# Patient Record
Sex: FEMALE | Race: WHITE | NOT HISPANIC OR LATINO | Employment: FULL TIME
[De-identification: names, ages, dates, MRNs, and addresses within clinical notes are randomized per-mention and may not be internally consistent; named-entity substitution may affect disease eponyms.]

---

## 2017-06-18 ENCOUNTER — RECORDS - HEALTHEAST (OUTPATIENT)
Dept: ADMINISTRATIVE | Facility: OTHER | Age: 62
End: 2017-06-18

## 2018-10-22 ENCOUNTER — OFFICE VISIT - HEALTHEAST (OUTPATIENT)
Dept: UROLOGY | Facility: CLINIC | Age: 63
End: 2018-10-22

## 2018-10-22 DIAGNOSIS — N20.9 CALCULUS OF URINARY TRACT: ICD-10-CM

## 2018-10-22 DIAGNOSIS — N20.1 CALCULUS OF URETER: ICD-10-CM

## 2018-10-22 DIAGNOSIS — N13.2 HYDRONEPHROSIS WITH URINARY OBSTRUCTION DUE TO URETERAL CALCULUS: ICD-10-CM

## 2018-10-22 LAB
ALBUMIN UR-MCNC: NEGATIVE MG/DL
APPEARANCE UR: CLEAR
BILIRUB UR QL STRIP: NEGATIVE
COLOR UR AUTO: YELLOW
GLUCOSE UR STRIP-MCNC: NEGATIVE MG/DL
HGB UR QL STRIP: ABNORMAL
KETONES UR STRIP-MCNC: NEGATIVE MG/DL
LEUKOCYTE ESTERASE UR QL STRIP: NEGATIVE
NITRATE UR QL: NEGATIVE
PH UR STRIP: 6 [PH] (ref 5–8)
SP GR UR STRIP: <=1.005 (ref 1–1.03)
UROBILINOGEN UR STRIP-ACNC: ABNORMAL

## 2018-11-05 ENCOUNTER — OFFICE VISIT - HEALTHEAST (OUTPATIENT)
Dept: UROLOGY | Facility: CLINIC | Age: 63
End: 2018-11-05

## 2018-11-05 ENCOUNTER — HOSPITAL ENCOUNTER (OUTPATIENT)
Dept: CT IMAGING | Facility: CLINIC | Age: 63
Discharge: HOME | End: 2018-11-05
Attending: PHYSICIAN ASSISTANT

## 2018-11-05 DIAGNOSIS — N20.1 CALCULUS OF URETER: ICD-10-CM

## 2018-11-05 DIAGNOSIS — N20.9 CALCULUS OF URINARY TRACT: ICD-10-CM

## 2018-11-05 LAB
ALBUMIN UR-MCNC: NEGATIVE MG/DL
APPEARANCE UR: CLEAR
BILIRUB UR QL STRIP: NEGATIVE
COLOR UR AUTO: YELLOW
GLUCOSE UR STRIP-MCNC: NEGATIVE MG/DL
HGB UR QL STRIP: NEGATIVE
KETONES UR STRIP-MCNC: NEGATIVE MG/DL
LEUKOCYTE ESTERASE UR QL STRIP: NEGATIVE
NITRATE UR QL: NEGATIVE
PH UR STRIP: 7 [PH] (ref 5–8)
SP GR UR STRIP: 1.01 (ref 1–1.03)
UROBILINOGEN UR STRIP-ACNC: NORMAL

## 2018-11-20 ENCOUNTER — OFFICE VISIT - HEALTHEAST (OUTPATIENT)
Dept: UROLOGY | Facility: CLINIC | Age: 63
End: 2018-11-20

## 2018-11-20 ENCOUNTER — HOSPITAL ENCOUNTER (OUTPATIENT)
Dept: CT IMAGING | Facility: CLINIC | Age: 63
Discharge: HOME | End: 2018-11-20
Attending: PHYSICIAN ASSISTANT

## 2018-11-20 DIAGNOSIS — N20.0 CALCULUS OF KIDNEY: ICD-10-CM

## 2018-11-20 DIAGNOSIS — N20.1 CALCULUS OF URETER: ICD-10-CM

## 2018-11-20 LAB
ALBUMIN UR-MCNC: NEGATIVE MG/DL
APPEARANCE UR: CLEAR
BILIRUB UR QL STRIP: NEGATIVE
COLOR UR AUTO: YELLOW
GLUCOSE UR STRIP-MCNC: NEGATIVE MG/DL
HGB UR QL STRIP: ABNORMAL
KETONES UR STRIP-MCNC: NEGATIVE MG/DL
LEUKOCYTE ESTERASE UR QL STRIP: NEGATIVE
NITRATE UR QL: NEGATIVE
PH UR STRIP: 5.5 [PH] (ref 5–8)
SP GR UR STRIP: 1.02 (ref 1–1.03)
UROBILINOGEN UR STRIP-ACNC: ABNORMAL

## 2018-11-22 ENCOUNTER — ANESTHESIA - HEALTHEAST (OUTPATIENT)
Dept: SURGERY | Facility: CLINIC | Age: 63
End: 2018-11-22

## 2018-11-23 ENCOUNTER — SURGERY - HEALTHEAST (OUTPATIENT)
Dept: SURGERY | Facility: CLINIC | Age: 63
End: 2018-11-23

## 2018-11-23 ASSESSMENT — MIFFLIN-ST. JEOR: SCORE: 1198.18

## 2018-11-29 ENCOUNTER — AMBULATORY - HEALTHEAST (OUTPATIENT)
Dept: UROLOGY | Facility: CLINIC | Age: 63
End: 2018-11-29

## 2018-11-29 DIAGNOSIS — N20.1 CALCULUS OF URETER: ICD-10-CM

## 2018-11-29 LAB
ALBUMIN UR-MCNC: ABNORMAL MG/DL
APPEARANCE UR: ABNORMAL
BILIRUB UR QL STRIP: NEGATIVE
COLOR UR AUTO: YELLOW
GLUCOSE UR STRIP-MCNC: NEGATIVE MG/DL
HGB UR QL STRIP: ABNORMAL
KETONES UR STRIP-MCNC: NEGATIVE MG/DL
LEUKOCYTE ESTERASE UR QL STRIP: ABNORMAL
NITRATE UR QL: NEGATIVE
PH UR STRIP: 5.5 [PH] (ref 5–8)
SP GR UR STRIP: 1.01 (ref 1–1.03)
UROBILINOGEN UR STRIP-ACNC: ABNORMAL

## 2018-11-30 LAB — BACTERIA SPEC CULT: NO GROWTH

## 2018-12-27 ENCOUNTER — OFFICE VISIT - HEALTHEAST (OUTPATIENT)
Dept: UROLOGY | Facility: CLINIC | Age: 63
End: 2018-12-27

## 2018-12-27 DIAGNOSIS — N20.1 CALCULUS OF URETER: ICD-10-CM

## 2018-12-27 DIAGNOSIS — N20.0 CALCULUS OF KIDNEY: ICD-10-CM

## 2018-12-27 LAB
ALBUMIN UR-MCNC: NEGATIVE MG/DL
APPEARANCE UR: CLEAR
BILIRUB UR QL STRIP: NEGATIVE
COLOR UR AUTO: YELLOW
GLUCOSE UR STRIP-MCNC: NEGATIVE MG/DL
HGB UR QL STRIP: ABNORMAL
KETONES UR STRIP-MCNC: NEGATIVE MG/DL
LEUKOCYTE ESTERASE UR QL STRIP: NEGATIVE
NITRATE UR QL: NEGATIVE
PH UR STRIP: 5.5 [PH] (ref 5–8)
SP GR UR STRIP: 1.01 (ref 1–1.03)
UROBILINOGEN UR STRIP-ACNC: ABNORMAL

## 2020-07-28 ENCOUNTER — COMMUNICATION - HEALTHEAST (OUTPATIENT)
Dept: SCHEDULING | Facility: CLINIC | Age: 65
End: 2020-07-28

## 2021-05-29 ENCOUNTER — HEALTH MAINTENANCE LETTER (OUTPATIENT)
Age: 66
End: 2021-05-29

## 2021-05-31 VITALS — BODY MASS INDEX: 28.58 KG/M2 | HEIGHT: 62 IN | WEIGHT: 155 LBS

## 2021-06-02 VITALS — WEIGHT: 155.6 LBS | BODY MASS INDEX: 29.38 KG/M2 | HEIGHT: 61 IN

## 2021-06-16 PROBLEM — N20.1 CALCULUS OF URETER: Status: ACTIVE | Noted: 2018-11-06

## 2021-06-21 NOTE — PROGRESS NOTES
Assessment/Plan:        Diagnoses and all orders for this visit:    Calculus of ureter  -     Symptom Control While Passing a Stone Education  -     CT Abdomen Pelvis Without Oral Without IV Contrast; Future; Expected date: 11/19/18  -     Patient Stated Goal: Pass my stone    Calculus of urinary tract  -     Urinalysis Macroscopic      Stone Management Plan  [facility] Stone [facility] 10/22/2018 11/5/2018   Urinary Tract Infection No suspicion of infection No suspicion of infection   Renal Colic Asymptomatic at this time Asymptomatic at this time   Renal Failure No suspicion of renal failure No suspicion of renal failure   Current CT date 10/21/2018 11/5/2018   Right sided stones? Yes Yes   R Number of ureteral stones 1 1   R GSD of ureteral stones 5 5   R Location of ureteral stone Distal Distal   R Number of kidney stones  No renal stones No renal stones   R GSD of kidney stones N/A N/A   R Hydronephrosis Mild None   R Stone Event New event Established event   Diagnosis date 10/21/2018 -   Initial location of primary symptomatic stone Distal -   Initial GSD of primary symptomatic stone 5 -   R MET status Initiation No progression   R Current Plan MET MET   MET 2 week F/U 2 week F/U   Left sided stones? No Yes   L Number of ureteral stones - No ureteral stones   L Number of kidney stones  - 1   L GSD of kidney stones - < 2   L Hydronephrosis - None   L Stone Event No current event No current event   L Current Plan - Observe   Observe rationale - Limited stone burden with good prognosis for spontaneous passage         Subjective:      HPI  Ms. Fidelina Ferris is a 62 y.o.  female returning to the [facility] [place] for medical expulsive therapy follow up.     On last encounter, her 5 mm stone was in right distal ureter with mild hydronephrosis. She has had no unanticipated events.    Symptoms have been minimal . She is asymptomatic at present. She denies symptoms of fever, chills, flank pain,  nausea, vomiting, urinary frequency and dysuria.     New CT scan was personally reviewed and demonstrates no progression of stone with resolution of previous hydronephrosis. Faint 1 mm left lower pole renal stone.    PLAN    61 yo F first time stone former with no progression of right distal ureteral stone, initially presenting with left back pain.    She will continue to attempt to pass stone and will return in 2 weeks with further imaging.     Patient also seen and examined by Isa Vraela PA-C       ROS   Review of systems is negative except for HPI.    Past Medical History:   Diagnosis Date     Kidney stone 10/2018    first stone at age 62       Past Surgical History:   Procedure Laterality Date     ROTATOR CUFF REPAIR Right 10/2018       Current Outpatient Prescriptions   Medication Sig Dispense Refill     cetirizine (ZYRTEC) 10 MG tablet        fluticasone (FLONASE) 50 mcg/actuation nasal spray USE 2 SPRAYS INTO BOTH NOSTRILS DAILY.       levothyroxine (SYNTHROID, LEVOTHROID) 50 MCG tablet TAKE ONE TABLET BY MOUTH EVERY DAY       pantoprazole (PROTONIX) 20 MG tablet Take 40 mg by mouth.       No current facility-administered medications for this visit.        Allergies   Allergen Reactions     Nitrofurantoin Shortness Of Breath     Sulfa (Sulfonamide Antibiotics) Swelling     Nasal swelling     Vicodin [Hydrocodone-Acetaminophen] Dizziness     Room spins       Social History     Social History     Marital status:      Spouse name: N/A     Number of children: N/A     Years of education: N/A     Occupational History     cook      Social History Main Topics     Smoking status: Never Smoker     Smokeless tobacco: Never Used     Alcohol use No     Drug use: No     Sexual activity: Not on file     Other Topics Concern     Not on file     Social History Narrative       Family History   Problem Relation Age of Onset     Osteoporosis Mother      Cancer Father      pancreatic     Osteoporosis Sister       Urolithiasis Brother      Cancer Paternal Aunt      brain tumor     Osteoporosis Paternal Uncle      Cancer Maternal Grandmother      breast     Heart disease Maternal Grandfather      Heart disease Paternal Grandfather      Urolithiasis Niece      Urolithiasis Nephew      Cancer Paternal Aunt      breast     Objective:      Physical Exam  Vitals:    11/05/18 1455   BP: 129/66   Pulse: (!) 104   Temp: 98.2  F (36.8  C)     General - well developed, well nourished, appropriate for age. Appears no distress at this time  Abdomen - slender soft, non-tender, no hepatosplenomegaly, no masses.   - no flank tenderness, no suprapubic tenderness, kidney and bladder non-palpable  MSK - normal spinal curvature. no spinal tenderness. normal gait. muscular strength intact.  Psych - oriented to time, place, and person, normal mood and affect.      Labs   Urinalysis POC (Office):  Nitrite, UA   Date Value Ref Range Status   11/05/2018 Negative Negative Final   10/22/2018 Negative Negative Final   10/21/2018 Negative Negative Final       Lab Urinalysis:  Blood, UA   Date Value Ref Range Status   11/05/2018 Negative Negative Final   10/22/2018 Trace (!) Negative Final   10/21/2018 Negative Negative Final     Nitrite, UA   Date Value Ref Range Status   11/05/2018 Negative Negative Final   10/22/2018 Negative Negative Final   10/21/2018 Negative Negative Final     Leukocytes, UA   Date Value Ref Range Status   11/05/2018 Negative Negative Final   10/22/2018 Negative Negative Final   10/21/2018 Negative Negative Final     pH, UA   Date Value Ref Range Status   11/05/2018 7.0 5.0 - 8.0 Final   10/22/2018 6.0 5.0 - 8.0 Final   10/21/2018 6.0 4.5 - 8.0 Final

## 2021-06-21 NOTE — ANESTHESIA PREPROCEDURE EVALUATION
Anesthesia Evaluation      Patient summary reviewed   History of anesthetic complications (PONV)     Airway   Mallampati: I  Neck ROM: full   Pulmonary - negative ROS and normal exam    breath sounds clear to auscultation                         Cardiovascular - negative ROS and normal exam  Exercise tolerance: > or = 4 METS  (-) murmur  Rhythm: regular  Rate: normal,    no murmur      Neuro/Psych - negative ROS     Endo/Other    (+) hypothyroidism,      GI/Hepatic/Renal    (+) GERD well controlled,   chronic renal disease (Kidney stone),           Dental    (+) caps    Comment: Crowns throughout                       Anesthesia Plan  Planned anesthetic: general LMA and total IV anesthesia  Gastric LMA    TIVA  Scopolamine patch  Decadron 10 mg  Zofran  ASA 2   Induction: intravenous   Anesthetic plan and risks discussed with: patient and spouse  Anesthesia plan special considerations: antiemetics,   Post-op plan: routine recovery

## 2021-06-21 NOTE — PROGRESS NOTES
Assessment/Plan:        Diagnoses and all orders for this visit:    Calculus of ureter  -     Ureteroscopy Education  -     Patient Stated Goal: Know what to expect after surgery    Calculus of kidney  -     Urinalysis Macroscopic      Stone Management Plan  [facility] Stone Management 10/22/2018 11/5/2018 11/20/2018   Urinary Tract Infection No suspicion of infection No suspicion of infection No suspicion of infection   Renal Colic Asymptomatic at this time Asymptomatic at this time Asymptomatic at this time   Renal Failure No suspicion of renal failure No suspicion of renal failure No suspicion of renal failure   Current CT date 10/21/2018 11/5/2018 11/20/2018   Right sided stones? Yes Yes Yes   R Number of ureteral stones 1 1 1   R GSD of ureteral stones 5 5 5   R Location of ureteral stone Distal Distal Distal   R Number of kidney stones  No renal stones No renal stones No renal stones   R GSD of kidney stones N/A N/A N/A   R Hydronephrosis Mild None None   R Stone Event New event Established event Established event   Diagnosis date 10/21/2018 - -   Initial location of primary symptomatic stone Distal - -   Initial GSD of primary symptomatic stone 5 - -   R MET status Initiation No progression No progression   R Current Plan MET MET Clear   MET 2 week F/U 2 week F/U -   Clear rationale - - MET failure   Left sided stones? No Yes No   L Number of ureteral stones - No ureteral stones -   L Number of kidney stones  - 1 -   L GSD of kidney stones - < 2 -   L Hydronephrosis - None -   L Stone Event No current event No current event No current event   L Current Plan - Observe -   Observe rationale - Limited stone burden with good prognosis for spontaneous passage -             Subjective:      HPI  Ms. Fidelina Ferris is a 63 y.o.  female returning to the [facility] [place] for medical expulsive therapy follow up.     On last encounter, her 5 mm stone was in right distal ureter with no  hydronephrosis. She has had no unanticipated events.    Symptoms have been minimal, with mild right side pain several days ago that resolved without intervention. She is asymptomatic at present. She denies symptoms of fever, chills, flank pain, nausea, vomiting, urinary frequency and dysuria. She is growing tired of her trial of passage phase, but is keeping a positive outlook.    New CT scan was personally reviewed and demonstrates no progression of stone with no hydronephrosis.     PLAN    61 yo F first time stone former with no progression of non-obstructing right distal ureteral stone, initially diagnosed 1 month ago.    She would like to proceed to the operating room for ureteroscopic stone clearance. Risks and benefits were detailed of ureteroscopic stone clearance including potential issues of urinary or systemic infection, ureteral injury, inaccessible stone, incomplete stone clearance, multiple surgeries, and stent related symptoms of urgency, frequency and hematuria. Current documentation is adequate for preoperative history and physical.    Patient also seen and examined by Isa Varela PA-C       ROS   Review of systems is negative except for HPI.    Past Medical History:   Diagnosis Date     GERD (gastroesophageal reflux disease)      Kidney stone 10/2018    first stone at age 62       Past Surgical History:   Procedure Laterality Date     ROTATOR CUFF REPAIR Right 10/2018       Current Outpatient Medications   Medication Sig Dispense Refill     cetirizine (ZYRTEC) 10 MG tablet        levothyroxine (SYNTHROID, LEVOTHROID) 50 MCG tablet TAKE ONE TABLET BY MOUTH EVERY DAY       pantoprazole (PROTONIX) 20 MG tablet Take 40 mg by mouth.       fluticasone (FLONASE) 50 mcg/actuation nasal spray USE 2 SPRAYS INTO BOTH NOSTRILS DAILY.       No current facility-administered medications for this visit.        Allergies   Allergen Reactions     Nitrofurantoin Shortness Of Breath     Sulfa (Sulfonamide Antibiotics)  Swelling     Nasal swelling     Vicodin [Hydrocodone-Acetaminophen] Dizziness     Room spins       Social History     Socioeconomic History     Marital status:      Spouse name: Not on file     Number of children: Not on file     Years of education: Not on file     Highest education level: Not on file   Social Needs     Financial resource strain: Not on file     Food insecurity - worry: Not on file     Food insecurity - inability: Not on file     Transportation needs - medical: Not on file     Transportation needs - non-medical: Not on file   Occupational History     Occupation: cook   Tobacco Use     Smoking status: Never Smoker     Smokeless tobacco: Never Used   Substance and Sexual Activity     Alcohol use: No     Drug use: No     Sexual activity: Not on file   Other Topics Concern     Not on file   Social History Narrative     Not on file       Family History   Problem Relation Age of Onset     Osteoporosis Mother      Cancer Father         pancreatic     Osteoporosis Sister      Urolithiasis Brother      Cancer Paternal Aunt         brain tumor     Osteoporosis Paternal Uncle      Cancer Maternal Grandmother         breast     Heart disease Maternal Grandfather      Heart disease Paternal Grandfather      Urolithiasis Niece      Urolithiasis Nephew      Cancer Paternal Aunt         breast     Objective:      Physical Exam  Vitals:    11/20/18 0754   BP: 137/75   Pulse: 86   Temp: 97.7  F (36.5  C)     General - well developed, well nourished, appropriate for age. Appears no distress at this time   Heart - regular rate and rhythm, no murmur  Respiratory - normal effort, clear to auscultation, good air entry without adventitious noises  Abdomen - slender soft, non-tender, no hepatosplenomegaly, no masses.   - no flank tenderness, no suprapubic tenderness, kidney and bladder non-palpable  MSK - normal spinal curvature. no spinal tenderness. normal gait. muscular strength intact.  Psych - oriented to  time, place, and person, normal mood and affect.    Labs   Urinalysis POC (Office):  Nitrite, UA   Date Value Ref Range Status   11/20/2018 Negative Negative Final   11/05/2018 Negative Negative Final   10/22/2018 Negative Negative Final       Lab Urinalysis:  Blood, UA   Date Value Ref Range Status   11/20/2018 Trace (!) Negative Final   11/05/2018 Negative Negative Final   10/22/2018 Trace (!) Negative Final     Nitrite, UA   Date Value Ref Range Status   11/20/2018 Negative Negative Final   11/05/2018 Negative Negative Final   10/22/2018 Negative Negative Final     Leukocytes, UA   Date Value Ref Range Status   11/20/2018 Negative Negative Final   11/05/2018 Negative Negative Final   10/22/2018 Negative Negative Final     pH, UA   Date Value Ref Range Status   11/20/2018 5.5 5.0 - 8.0 Final   11/05/2018 7.0 5.0 - 8.0 Final   10/22/2018 6.0 5.0 - 8.0 Final

## 2021-06-21 NOTE — ANESTHESIA CARE TRANSFER NOTE
Last vitals:   Vitals:    11/23/18 0806   BP: 103/58   Pulse: 91   Resp: 12   Temp: 36.3  C (97.4  F)   SpO2: 98%     Patient's level of consciousness is drowsy  Spontaneous respirations: yes  Maintains airway independently: yes  Dentition unchanged: yes  Oropharynx: oral airway in place    QCDR Measures:  ASA# 20 - Surgical Safety Checklist: WHO surgical safety checklist completed prior to induction    PQRS# 430 - Adult PONV Prevention: 4558F - Pt received => 2 anti-emetic agents (different classes) preop & intraop  ASA# 8 - Peds PONV Prevention: NA - Not pediatric patient, not GA or 2 or more risk factors NOT present  PQRS# 424 - Yvette-op Temp Management: 4559F - At least one body temp DOCUMENTED => 35.5C or 95.9F within required timeframe  PQRS# 426 - PACU Transfer Protocol: - Transfer of care checklist used  ASA# 14 - Acute Post-op Pain: ASA14B - Patient did NOT experience pain >= 7 out of 10

## 2021-06-21 NOTE — PROGRESS NOTES
Assessment/Plan:        Diagnoses and all orders for this visit:    Calculus of ureter  -     Symptom Control While Passing a Stone Education  -     CT Abdomen Pelvis Without Oral Without IV Contrast; Future; Expected date: 11/5/18  -     Patient Stated Goal: Pass my stone    Calculus of urinary tract  -     Urinalysis Macroscopic    Hydronephrosis with urinary obstruction due to ureteral calculus    Other orders  -     fluticasone (FLONASE) 50 mcg/actuation nasal spray; USE 2 SPRAYS INTO BOTH NOSTRILS DAILY.  -     levothyroxine (SYNTHROID, LEVOTHROID) 50 MCG tablet; TAKE ONE TABLET BY MOUTH EVERY DAY  -     pantoprazole (PROTONIX) 20 MG tablet; Take 40 mg by mouth.  -     predniSONE (DELTASONE) 20 MG tablet; Take 40 mg by mouth.  -     cetirizine (ZYRTEC) 10 MG tablet;       Stone Management Plan  [facility] Stone Management 10/22/2018   Urinary Tract Infection No suspicion of infection   Renal Colic Asymptomatic at this time   Renal Failure No suspicion of renal failure   Current CT date 10/21/2018   Right sided stones? Yes   R Number of ureteral stones 1   R GSD of ureteral stones 5   R Location of ureteral stone Distal   R Number of kidney stones  No renal stones   R GSD of kidney stones N/A   R Hydronephrosis Mild   R Stone Event New event   Diagnosis date 10/21/2018   Initial location of primary symptomatic stone Distal   Initial GSD of primary symptomatic stone 5   R MET status Initiation   R Current Plan MET   MET 2 week F/U   Left sided stones? No   L Stone Event No current event         Subjective:      HPI  Ms. Fidelina Ferris is a 62 y.o.  female presenting to the [facility] [place] following ED visit for urolithiasis.    She is a first time unidentified composition stone former who has not required stone clearance procedures. She has not previously participated in stone risk evaluation. She has no identified modifiable stone risk factors. She has no identified non-modifiable stone  risk factors.    Primary symptom at presentation was left lower back pain x 1 week. The pain was intermittent with no associated alleviating or aggravating factors. She notes diffuse lower abdominal pain, left > right occurring yesterday with severe left back pain. At its worst, the pain was sharp, 10/10 with some improvement following use of oxycodone and visteral. She has also experienced tingling and numbness into the left lower extremity with back pain and was seen by her PCP recently for sciatica. She was recommended for physical therapy.     She is asymptomatic at present. She denies current symptoms of fever, chills, flank pain, nausea, vomiting, urinary frequency and dysuria.     CT scan from 10/21/18 is personally reviewed and demonstrates a mildly obstructing 5 mm right distal ureteral stone. No additional urinary tract stones.    Significant labs from presentation include no hematuria, no pyuria, negative nitrite, no bacteria, normal WBC, normal creatinine and normal potassium.    PLAN    63 yo F first time stone former with incidentally diagnosed right distal ureteral stone, initially presenting with left back pain.    Will proceed with medical expulsive therapy. Risks and benefits were detailed of medical expulsive therapy including probability of stone passage, recurrent renal colic, and requirement of emergency medical and/or surgical care and further imaging. Patient verbalized understanding. Patient agrees with plan as discussed. She will return in 2 weeks with low dose CT scan.    For symptom control, she was prescribed oxycodone. Over the counter symptom control medications of ibuprofen, Dramamine and Tylenol were recommended.    Patient also seen and examined by ANMOL Contreras   Review of Systems  A 12 point comprehensive review of systems is negative except for HPI    Past Medical History:   Diagnosis Date     Kidney stone 10/2018    first stone at age 62       Past Surgical  History:   Procedure Laterality Date     ROTATOR CUFF REPAIR Right 10/2018       Current Outpatient Prescriptions   Medication Sig Dispense Refill     cetirizine (ZYRTEC) 10 MG tablet        fluticasone (FLONASE) 50 mcg/actuation nasal spray USE 2 SPRAYS INTO BOTH NOSTRILS DAILY.       levothyroxine (SYNTHROID, LEVOTHROID) 50 MCG tablet TAKE ONE TABLET BY MOUTH EVERY DAY       pantoprazole (PROTONIX) 20 MG tablet Take 40 mg by mouth.       predniSONE (DELTASONE) 20 MG tablet Take 40 mg by mouth.       No current facility-administered medications for this visit.        Allergies   Allergen Reactions     Nitrofurantoin Shortness Of Breath     Sulfa (Sulfonamide Antibiotics) Swelling     Nasal swelling     Vicodin [Hydrocodone-Acetaminophen] Dizziness     Room spins       Social History     Social History     Marital status:      Spouse name: N/A     Number of children: N/A     Years of education: N/A     Occupational History     cook      Social History Main Topics     Smoking status: Never Smoker     Smokeless tobacco: Never Used     Alcohol use No     Drug use: No     Sexual activity: Not on file     Other Topics Concern     Not on file     Social History Narrative       Family History   Problem Relation Age of Onset     Osteoporosis Mother      Cancer Father      pancreatic     Osteoporosis Sister      Urolithiasis Brother      Cancer Paternal Aunt      brain tumor     Osteoporosis Paternal Uncle      Cancer Maternal Grandmother      breast     Heart disease Maternal Grandfather      Heart disease Paternal Grandfather      Urolithiasis Niece      Urolithiasis Nephew      Cancer Paternal Aunt      breast       Objective:      Physical Exam  Vitals:    10/22/18 1032   BP: 124/60   Pulse: (!) 104     General - well developed, well nourished, appropriate for age. Appears no distress at this time   Heart - regular rate and rhythm, no murmur  Respiratory - normal effort, clear to auscultation, good air entry  without adventitious noises  Abdomen - mildly obese soft, non-tender, no hepatosplenomegaly, no masses.   - no flank tenderness, no suprapubic tenderness, kidney and bladder non-palpable  MSK - normal spinal curvature. no spinal tenderness. normal gait. muscular strength intact.  Neurology - cranial nerves II-XII grossly intact, normal sensation, no unsteadiness  Skin - intact, no bruising, no gouty tophi  Psych - oriented to time, place, and person, normal mood and affect.    Labs  Urinalysis POC (Office):  Nitrite, UA   Date Value Ref Range Status   10/22/2018 Negative Negative Final   10/21/2018 Negative Negative Final       Lab Urinalysis:  Blood, UA   Date Value Ref Range Status   10/22/2018 Trace (!) Negative Final   10/21/2018 Negative Negative Final     Nitrite, UA   Date Value Ref Range Status   10/22/2018 Negative Negative Final   10/21/2018 Negative Negative Final     Leukocytes, UA   Date Value Ref Range Status   10/22/2018 Negative Negative Final   10/21/2018 Negative Negative Final     pH, UA   Date Value Ref Range Status   10/22/2018 6.0 5.0 - 8.0 Final   10/21/2018 6.0 4.5 - 8.0 Final    and Acute Labs   CBC   WBC   Date Value Ref Range Status   10/21/2018 10.9 4.0 - 11.0 thou/uL Final     Hemoglobin   Date Value Ref Range Status   10/21/2018 11.6 (L) 12.0 - 16.0 g/dL Final     Platelets   Date Value Ref Range Status   10/21/2018 276 140 - 440 thou/uL Final    and Renal Panel  [facility]  Creatinine   Date Value Ref Range Status   10/21/2018 0.73 0.60 - 1.10 mg/dL Final     Potassium   Date Value Ref Range Status   10/21/2018 3.7 3.5 - 5.0 mmol/L Final     Calcium   Date Value Ref Range Status   10/21/2018 9.0 8.5 - 10.5 mg/dL Final

## 2021-06-21 NOTE — PROGRESS NOTES
Patient presents to the office today from emergency room visit follow-up for stone consultation.

## 2021-06-21 NOTE — ANESTHESIA POSTPROCEDURE EVALUATION
Patient: Fidelina Ferris  CYSTOSCOPY, RIGHT  URETEROSCOPY,  STENT INSERTION  Anesthesia type: general    Patient location: Phase II Recovery  Last vitals:   Vitals:    11/23/18 0900   BP: 120/66   Pulse: 87   Resp: 16   Temp:    SpO2: 93%     Post vital signs: stable  Level of consciousness: awake, alert and oriented  Post-anesthesia pain: pain controlled  Post-anesthesia nausea and vomiting: no  Pulmonary: unassisted, return to baseline  Cardiovascular: stable and blood pressure at baseline  Hydration: adequate  Anesthetic events: no    QCDR Measures:  ASA# 11 - Yvette-op Cardiac Arrest: ASA11B - Patient did NOT experience unanticipated cardiac arrest  ASA# 12 - Yvette-op Mortality Rate: ASA12B - Patient did NOT die  ASA# 13 - PACU Re-Intubation Rate: ASA13B - Patient did NOT require a new airway mgmt  ASA# 10 - Composite Anes Safety: ASA10A - No serious adverse event    Additional Notes:

## 2021-06-22 NOTE — PROGRESS NOTES
Assessment/Plan:        Diagnoses and all orders for this visit:    Calculus of ureter  -     Urinalysis Macroscopic  -     Culture, Urine- Today; Future; Expected date: 11/29/2018  -     Culture, Urine- Today  -     Cystoscopy with Stent Removal Education  -     Patient Stated Goal: Prevent further stones  -     ciprofloxacin HCl tablet 250 mg (CIPRO); Take 1 tablet (250 mg total) by mouth once.          Other orders  -     IBUPROFEN ORAL; Take 200 mg by mouth.  -     dimenhyDRINATE (DRAMAMINE) 50 MG tablet; Take 50 mg by mouth at bedtime as needed.  -     HYDROXYZINE HCL ORAL; Take by mouth.  -     oxyCODONE 5 mg TbOr; Take 5 mg by mouth every 4 (four) hours.  -     ciprofloxacin HCl (CIPRO) 250 MG tablet;       Stone Management Plan  [facility] [place] Stone Management 11/5/2018 11/20/2018 11/29/2018   Urinary Tract Infection No suspicion of infection No suspicion of infection No suspicion of infection   Renal Colic Asymptomatic at this time Asymptomatic at this time Well controlled symptoms   Renal Failure No suspicion of renal failure No suspicion of renal failure No suspicion of renal failure   Current CT date 11/5/2018 11/20/2018 -   Right sided stones? Yes Yes -   R Number of ureteral stones 1 1 -   R GSD of ureteral stones 5 5 -   R Location of ureteral stone Distal Distal -   R Number of kidney stones  No renal stones No renal stones -   R GSD of kidney stones N/A N/A -   R Hydronephrosis None None -   R Stone Event Established event Established event Established event   Diagnosis date - - -   Initial location of primary symptomatic stone - - -   Initial GSD of primary symptomatic stone - - -   R MET status No progression No progression -   R Current Plan MET Clear -   MET 2 week F/U - -   Clear rationale - MET failure -   Left sided stones? Yes No -   L Number of ureteral stones No ureteral stones - -   L Number of kidney stones  1 - -   L GSD of kidney stones < 2 - -   L Hydronephrosis None - -   L Stone Event No  current event No current event No current event   L Current Plan Observe - -   Observe rationale Limited stone burden with good prognosis for spontaneous passage - -             Subjective:      HPI  Ms. Fidelina Ferris is a 63 y.o.  female returning to the [facility] [place] for early postoperative follow up for anticipated stent removal.     She returns status post right ureteroscopic extraction for distal ureteral stone. She has had no unanticipated post-operative events.    She has had no symptoms suspicious for infection and stent was mildly symptomatic with typical issues of flank discomfort and lower urinary tract irritation.     Flexible cystoscopy is performed and indwelling stent is removed without incident.    She will follow up in the office in one month without imaging.       ROS   Review of systems is negative except for HPI.    Past Medical History:   Diagnosis Date     GERD (gastroesophageal reflux disease)      Kidney stone 10/2018    first stone at age 62     PONV (postoperative nausea and vomiting)        Past Surgical History:   Procedure Laterality Date     APPENDECTOMY       BLADDER REPAIR       CHOLECYSTECTOMY       HYSTERECTOMY       OOPHORECTOMY Bilateral      [place] CYSTO/URETERO W/LITHOTRIPSY &INDWELL STENT INSRT Right 11/23/2018    Procedure: CYSTOSCOPY, RIGHT  URETEROSCOPY,  STENT INSERTION;  Surgeon: Siva Douglas MD;  Location: [facility];  Service: Urology     ROTATOR CUFF REPAIR Right 06/20/20118     TONSILLECTOMY       TUBAL LIGATION         Current Outpatient Medications   Medication Sig Dispense Refill     ascorbate calcium (VITAMIN C ORAL) Take 1 tablet by mouth daily.       CALCIUM ORAL Take 1 tablet by mouth daily.       cetirizine (ZYRTEC) 10 MG tablet Take 10 mg by mouth at bedtime .             dimenhyDRINATE (DRAMAMINE) 50 MG tablet Take 50 mg by mouth at bedtime as needed.       fluticasone (FLONASE) 50 mcg/actuation nasal spray USE 2  SPRAYS INTO BOTH NOSTRILS DAILY as needed       glucosamine HCl/chondroitin thomas (GLUCOSAMINE-CHONDROITIN ORAL) Take 1 tablet by mouth daily.       HYDROXYZINE HCL ORAL Take by mouth.       IBUPROFEN ORAL Take 200 mg by mouth.       levothyroxine (SYNTHROID, LEVOTHROID) 50 MCG tablet TAKE ONE TABLET BY MOUTH EVERY DAY       MULTIVITAMIN ORAL Take 1 tablet by mouth daily.       oxyCODONE 5 mg TbOr Take 5 mg by mouth every 4 (four) hours.       pantoprazole (PROTONIX) 20 MG tablet Take 20 mg by mouth 2 (two) times a day .             No current facility-administered medications for this visit.        Allergies   Allergen Reactions     Nitrofurantoin Shortness Of Breath     Adhesive Tape-Silicones Itching     Redness       Latex Itching and Other (See Comments)     redness     Other Food Allergy      strawberries and raspberries cause tounge gets raw       Seafood      hives     Sulfa (Sulfonamide Antibiotics) Swelling     Nasal swelling     Vicodin [Hydrocodone-Acetaminophen] Dizziness     Room spins       Social History     Socioeconomic History     Marital status:      Spouse name: Not on file     Number of children: Not on file     Years of education: Not on file     Highest education level: Not on file   Social Needs     Financial resource strain: Not on file     Food insecurity - worry: Not on file     Food insecurity - inability: Not on file     Transportation needs - medical: Not on file     Transportation needs - non-medical: Not on file   Occupational History     Occupation: cook   Tobacco Use     Smoking status: Never Smoker     Smokeless tobacco: Never Used   Substance and Sexual Activity     Alcohol use: No     Drug use: No     Sexual activity: Not on file   Other Topics Concern     Not on file   Social History Narrative     Not on file       Family History   Problem Relation Age of Onset     Osteoporosis Mother      Cancer Father         pancreatic     Osteoporosis Sister      Urolithiasis Brother       Cancer Paternal Aunt         brain tumor     Osteoporosis Paternal Uncle      Cancer Maternal Grandmother         breast     Heart disease Maternal Grandfather      Heart disease Paternal Grandfather      Urolithiasis Niece      Urolithiasis Nephew      Cancer Paternal Aunt         breast     Objective:      Physical Exam  Vitals:    11/29/18 1539   BP: 136/69   Pulse: 84   Temp: 97.6  F (36.4  C)     General - well developed, well nourished, appropriate for age. Appears no distress at this time  Abdomen - mildly obese soft, non-tender, no hepatosplenomegaly, no masses.   - no flank tenderness, no suprapubic tenderness, kidney and bladder non-palpable  MSK - normal spinal curvature. no spinal tenderness. normal gait. muscular strength intact.  Psych - oriented to time, place, and person, normal mood and affect.      Labs   Urinalysis POC (Office):  Nitrite, UA   Date Value Ref Range Status   11/29/2018 Negative Negative Final   11/20/2018 Negative Negative Final   11/05/2018 Negative Negative Final       Lab Urinalysis:  Blood, UA   Date Value Ref Range Status   11/29/2018 Large (!) Negative Final   11/20/2018 Trace (!) Negative Final   11/05/2018 Negative Negative Final     Nitrite, UA   Date Value Ref Range Status   11/29/2018 Negative Negative Final   11/20/2018 Negative Negative Final   11/05/2018 Negative Negative Final     Leukocytes, UA   Date Value Ref Range Status   11/29/2018 Moderate (!) Negative Final   11/20/2018 Negative Negative Final   11/05/2018 Negative Negative Final     pH, UA   Date Value Ref Range Status   11/29/2018 5.5 5.0 - 8.0 Final   11/20/2018 5.5 5.0 - 8.0 Final   11/05/2018 7.0 5.0 - 8.0 Final

## 2021-06-22 NOTE — PROGRESS NOTES
Assessment/Plan:        Diagnoses and all orders for this visit:    Calculus of ureter  -     Patient Stated Goal: Prevent further stones  -     Calcium Oxalate Stone Prevention Education    Calculus of kidney  -     Urinalysis Macroscopic      Stone Management Plan  [facility] Stone Management 11/20/2018 11/29/2018 12/27/2018   Urinary Tract Infection No suspicion of infection No suspicion of infection No suspicion of infection   Renal Colic Asymptomatic at this time Well controlled symptoms Asymptomatic at this time   Renal Failure No suspicion of renal failure No suspicion of renal failure No suspicion of renal failure   Current CT date 11/20/2018 - -   Right sided stones? Yes - -   R Number of ureteral stones 1 - -   R GSD of ureteral stones 5 - -   R Location of ureteral stone Distal - -   R Number of kidney stones  No renal stones - -   R GSD of kidney stones N/A - -   R Hydronephrosis None - -   R Stone Event Established event Established event Resolved event   Diagnosis date - - -   Initial location of primary symptomatic stone - - -   Initial GSD of primary symptomatic stone - - -   Resolved date - - 12/27/2018   R Post-op status - - No imaging   R MET status No progression - -   R Current Plan Clear - -   MET - - -   Clear rationale MET failure - -   Left sided stones? No - No   L Number of ureteral stones - - -   L Number of kidney stones  - - -   L GSD of kidney stones - - -   L Hydronephrosis - - -   L Stone Event No current event No current event No current event   L Current Plan - - -   Observe rationale - - -         Subjective:      HPI  Ms. Fidelina Ferris is a 63 y.o.  female returning to the [facility] [place] for late postoperative follow-up.     She returns status post Right ureteroscopic extraction for distal ureteral stone. She has had no unanticipated events.     She is asymptomatic at present. She denies symptoms of fever, chills, flank pain, nausea, vomiting, urinary  frequency and dysuria.    Imaging was not performed today because stone was extracted intact and patient is asymptomatic.     Stone composition was 100% calcium oxalate.     PLAN    61 yo F first time stone former s/p ureteroscopic extraction of obstructing right distal ureteral stone, no postoperative imaging warranted.    She is a low risk first time stone former and was educated on conservative strategies for risk reduction. She is happy to follow up on a prn basis.    Patient also seen and examined by ANMOL Contreras   Review of systems is negative except for HPI.    Past Medical History:   Diagnosis Date     GERD (gastroesophageal reflux disease)      Kidney stone 10/2018    first stone at age 62     PONV (postoperative nausea and vomiting)        Past Surgical History:   Procedure Laterality Date     APPENDECTOMY       BLADDER REPAIR       CHOLECYSTECTOMY       HYSTERECTOMY       OOPHORECTOMY Bilateral      [place] CYSTO/URETERO W/LITHOTRIPSY &INDWELL STENT INSRT Right 11/23/2018    Procedure: CYSTOSCOPY, RIGHT  URETEROSCOPY,  STENT INSERTION;  Surgeon: Siva Douglas MD;  Location: [facility];  Service: Urology     ROTATOR CUFF REPAIR Right 06/20/20118     TONSILLECTOMY       TUBAL LIGATION         Current Outpatient Medications   Medication Sig Dispense Refill     ascorbate calcium (VITAMIN C ORAL) Take 1 tablet by mouth daily.       CALCIUM ORAL Take 1 tablet by mouth daily.       cetirizine (ZYRTEC) 10 MG tablet Take 10 mg by mouth at bedtime .             fluticasone (FLONASE) 50 mcg/actuation nasal spray USE 2 SPRAYS INTO BOTH NOSTRILS DAILY as needed       glucosamine HCl/chondroitin thomas (GLUCOSAMINE-CHONDROITIN ORAL) Take 1 tablet by mouth daily.       levothyroxine (SYNTHROID, LEVOTHROID) 50 MCG tablet TAKE ONE TABLET BY MOUTH EVERY DAY       MULTIVITAMIN ORAL Take 1 tablet by mouth daily.       pantoprazole (PROTONIX) 20 MG tablet Take 20 mg by mouth 2 (two) times a day .              No current facility-administered medications for this visit.        Allergies   Allergen Reactions     Nitrofurantoin Shortness Of Breath     Adhesive Tape-Silicones Itching     Redness       Latex Itching and Other (See Comments)     redness     Other Food Allergy      strawberries and raspberries cause tounge gets raw       Seafood      hives     Sulfa (Sulfonamide Antibiotics) Swelling     Nasal swelling     Vicodin [Hydrocodone-Acetaminophen] Dizziness     Room spins       Social History     Socioeconomic History     Marital status:      Spouse name: Not on file     Number of children: Not on file     Years of education: Not on file     Highest education level: Not on file   Social Needs     Financial resource strain: Not on file     Food insecurity - worry: Not on file     Food insecurity - inability: Not on file     Transportation needs - medical: Not on file     Transportation needs - non-medical: Not on file   Occupational History     Occupation: cook   Tobacco Use     Smoking status: Never Smoker     Smokeless tobacco: Never Used   Substance and Sexual Activity     Alcohol use: No     Drug use: No     Sexual activity: Not on file   Other Topics Concern     Not on file   Social History Narrative     Not on file       Family History   Problem Relation Age of Onset     Osteoporosis Mother      Cancer Father         pancreatic     Osteoporosis Sister      Urolithiasis Brother      Cancer Paternal Aunt         brain tumor     Osteoporosis Paternal Uncle      Cancer Maternal Grandmother         breast     Heart disease Maternal Grandfather      Heart disease Paternal Grandfather      Urolithiasis Niece      Urolithiasis Nephew      Cancer Paternal Aunt         breast     Objective:      Physical Exam  Vitals:    12/27/18 1259   BP: 133/70   Pulse: 98   Temp: 98.2  F (36.8  C)     General - well developed, well nourished, appropriate for age. Appears no distress at this time  Abdomen - slender soft,  non-tender, no hepatosplenomegaly, no masses.   - no flank tenderness, no suprapubic tenderness, kidney and bladder non-palpable  MSK - normal spinal curvature. no spinal tenderness. normal gait. muscular strength intact.  Psych - oriented to time, place, and person, normal mood and affect.      Labs   Urinalysis POC (Office):  Nitrite, UA   Date Value Ref Range Status   12/27/2018 Negative Negative Final   11/29/2018 Negative Negative Final   11/20/2018 Negative Negative Final       Lab Urinalysis:  Blood, UA   Date Value Ref Range Status   12/27/2018 Trace (!) Negative Final   11/29/2018 Large (!) Negative Final   11/20/2018 Trace (!) Negative Final     Nitrite, UA   Date Value Ref Range Status   12/27/2018 Negative Negative Final   11/29/2018 Negative Negative Final   11/20/2018 Negative Negative Final     Leukocytes, UA   Date Value Ref Range Status   12/27/2018 Negative Negative Final   11/29/2018 Moderate (!) Negative Final   11/20/2018 Negative Negative Final     pH, UA   Date Value Ref Range Status   12/27/2018 5.5 5.0 - 8.0 Final   11/29/2018 5.5 5.0 - 8.0 Final   11/20/2018 5.5 5.0 - 8.0 Final

## 2021-06-22 NOTE — PROGRESS NOTES
Patient in office today for cystoscopy right ureteral stent removal procedure.    Patient educated regarding stent removal procedure and possible symptoms after removal.  Patient voiced understanding of information.  Handout given to patient.  Consent form signed.    [facility] Timeout    Correct patient?: Yes  Correct site?:  Yes  Correct procedure?:  Yes  Correct laterality?:  Right  Consents verified?:  Yes  Relevant lab results available?:  Yes

## 2021-09-18 ENCOUNTER — HEALTH MAINTENANCE LETTER (OUTPATIENT)
Age: 66
End: 2021-09-18

## 2021-10-24 ENCOUNTER — HOSPITAL ENCOUNTER (EMERGENCY)
Facility: CLINIC | Age: 66
Discharge: HOME | End: 2021-10-24
Attending: FAMILY MEDICINE | Admitting: FAMILY MEDICINE
Payer: COMMERCIAL

## 2021-10-24 ENCOUNTER — APPOINTMENT (OUTPATIENT)
Dept: CT IMAGING | Facility: CLINIC | Age: 66
End: 2021-10-24
Attending: FAMILY MEDICINE
Payer: COMMERCIAL

## 2021-10-24 ENCOUNTER — APPOINTMENT (OUTPATIENT)
Dept: MRI IMAGING | Facility: CLINIC | Age: 66
End: 2021-10-24
Attending: FAMILY MEDICINE
Payer: COMMERCIAL

## 2021-10-24 VITALS
SYSTOLIC BLOOD PRESSURE: 120 MMHG | HEART RATE: 66 BPM | WEIGHT: 157 LBS | BODY MASS INDEX: 29.18 KG/M2 | OXYGEN SATURATION: 97 % | TEMPERATURE: 96.9 F | DIASTOLIC BLOOD PRESSURE: 72 MMHG | RESPIRATION RATE: 16 BRPM

## 2021-10-24 DIAGNOSIS — G44.219 EPISODIC TENSION-TYPE HEADACHE, NOT INTRACTABLE: ICD-10-CM

## 2021-10-24 DIAGNOSIS — R20.0 RIGHT FACIAL NUMBNESS: ICD-10-CM

## 2021-10-24 LAB
ANION GAP SERPL CALCULATED.3IONS-SCNC: 7 MMOL/L (ref 3–14)
BASOPHILS # BLD AUTO: 0.1 10E3/UL (ref 0–0.2)
BASOPHILS NFR BLD AUTO: 1 %
BUN SERPL-MCNC: 12 MG/DL (ref 7–30)
CALCIUM SERPL-MCNC: 8.6 MG/DL (ref 8.5–10.1)
CHLORIDE BLD-SCNC: 110 MMOL/L (ref 94–109)
CO2 SERPL-SCNC: 24 MMOL/L (ref 20–32)
CREAT SERPL-MCNC: 0.7 MG/DL (ref 0.52–1.04)
EOSINOPHIL # BLD AUTO: 0.3 10E3/UL (ref 0–0.7)
EOSINOPHIL NFR BLD AUTO: 5 %
ERYTHROCYTE [DISTWIDTH] IN BLOOD BY AUTOMATED COUNT: 13.3 % (ref 10–15)
GFR SERPL CREATININE-BSD FRML MDRD: >90 ML/MIN/1.73M2
GLUCOSE BLD-MCNC: 124 MG/DL (ref 70–99)
GLUCOSE BLDC GLUCOMTR-MCNC: 128 MG/DL (ref 70–99)
HCT VFR BLD AUTO: 43.6 % (ref 35–47)
HGB BLD-MCNC: 14.4 G/DL (ref 11.7–15.7)
HOLD SPECIMEN: NORMAL
IMM GRANULOCYTES # BLD: 0 10E3/UL
IMM GRANULOCYTES NFR BLD: 0 %
LYMPHOCYTES # BLD AUTO: 2.6 10E3/UL (ref 0.8–5.3)
LYMPHOCYTES NFR BLD AUTO: 41 %
MCH RBC QN AUTO: 29.5 PG (ref 26.5–33)
MCHC RBC AUTO-ENTMCNC: 33 G/DL (ref 31.5–36.5)
MCV RBC AUTO: 89 FL (ref 78–100)
MONOCYTES # BLD AUTO: 0.5 10E3/UL (ref 0–1.3)
MONOCYTES NFR BLD AUTO: 8 %
NEUTROPHILS # BLD AUTO: 2.9 10E3/UL (ref 1.6–8.3)
NEUTROPHILS NFR BLD AUTO: 45 %
NRBC # BLD AUTO: 0 10E3/UL
NRBC BLD AUTO-RTO: 0 /100
PLATELET # BLD AUTO: 262 10E3/UL (ref 150–450)
POTASSIUM BLD-SCNC: 3.9 MMOL/L (ref 3.4–5.3)
RBC # BLD AUTO: 4.88 10E6/UL (ref 3.8–5.2)
SODIUM SERPL-SCNC: 141 MMOL/L (ref 133–144)
TROPONIN I SERPL-MCNC: <0.015 UG/L (ref 0–0.04)
WBC # BLD AUTO: 6.3 10E3/UL (ref 4–11)

## 2021-10-24 PROCEDURE — 99285 EMERGENCY DEPT VISIT HI MDM: CPT | Mod: 25 | Performed by: FAMILY MEDICINE

## 2021-10-24 PROCEDURE — 36415 COLL VENOUS BLD VENIPUNCTURE: CPT | Performed by: FAMILY MEDICINE

## 2021-10-24 PROCEDURE — 99207 PR NO BILLABLE SERVICE THIS VISIT: CPT | Performed by: STUDENT IN AN ORGANIZED HEALTH CARE EDUCATION/TRAINING PROGRAM

## 2021-10-24 PROCEDURE — 70496 CT ANGIOGRAPHY HEAD: CPT

## 2021-10-24 PROCEDURE — A9585 GADOBUTROL INJECTION: HCPCS | Performed by: FAMILY MEDICINE

## 2021-10-24 PROCEDURE — 70450 CT HEAD/BRAIN W/O DYE: CPT

## 2021-10-24 PROCEDURE — 93005 ELECTROCARDIOGRAM TRACING: CPT | Performed by: FAMILY MEDICINE

## 2021-10-24 PROCEDURE — 250N000011 HC RX IP 250 OP 636: Performed by: FAMILY MEDICINE

## 2021-10-24 PROCEDURE — 255N000002 HC RX 255 OP 636: Performed by: FAMILY MEDICINE

## 2021-10-24 PROCEDURE — 250N000009 HC RX 250: Performed by: FAMILY MEDICINE

## 2021-10-24 PROCEDURE — 70553 MRI BRAIN STEM W/O & W/DYE: CPT

## 2021-10-24 PROCEDURE — 85004 AUTOMATED DIFF WBC COUNT: CPT | Performed by: FAMILY MEDICINE

## 2021-10-24 PROCEDURE — 84484 ASSAY OF TROPONIN QUANT: CPT | Performed by: FAMILY MEDICINE

## 2021-10-24 PROCEDURE — 80048 BASIC METABOLIC PNL TOTAL CA: CPT | Performed by: FAMILY MEDICINE

## 2021-10-24 PROCEDURE — 93010 ELECTROCARDIOGRAM REPORT: CPT | Performed by: FAMILY MEDICINE

## 2021-10-24 PROCEDURE — 99451 NTRPROF PH1/NTRNET/EHR 5/>: CPT | Performed by: PHYSICIAN ASSISTANT

## 2021-10-24 RX ORDER — IOPAMIDOL 755 MG/ML
70 INJECTION, SOLUTION INTRAVASCULAR ONCE
Status: COMPLETED | OUTPATIENT
Start: 2021-10-24 | End: 2021-10-24

## 2021-10-24 RX ORDER — GADOBUTROL 604.72 MG/ML
7 INJECTION INTRAVENOUS ONCE
Status: COMPLETED | OUTPATIENT
Start: 2021-10-24 | End: 2021-10-24

## 2021-10-24 RX ADMIN — IOPAMIDOL 70 ML: 755 INJECTION, SOLUTION INTRAVENOUS at 11:44

## 2021-10-24 RX ADMIN — GADOBUTROL 7 ML: 604.72 INJECTION INTRAVENOUS at 12:30

## 2021-10-24 RX ADMIN — SODIUM CHLORIDE 100 ML: 9 INJECTION, SOLUTION INTRAVENOUS at 11:44

## 2021-10-24 ASSESSMENT — ENCOUNTER SYMPTOMS
NAUSEA: 0
SINUS PRESSURE: 0
DIARRHEA: 0
DIAPHORESIS: 0
NUMBNESS: 1
FREQUENCY: 0
ABDOMINAL PAIN: 0
VOMITING: 0
COUGH: 0
SHORTNESS OF BREATH: 0
FEVER: 0
HEADACHES: 1
SORE THROAT: 0
BLOOD IN STOOL: 0
WHEEZING: 0
PALPITATIONS: 0
CONSTIPATION: 0
DYSURIA: 0
CHILLS: 0

## 2021-10-24 NOTE — ED NOTES
Back from CT, changed into MRI gown, filling out paperwork. Denies headache, neurologically intact.

## 2021-10-24 NOTE — ED PROVIDER NOTES
History     Chief Complaint   Patient presents with     Numbness     headache     HPI  Fidelina Ferris is a 65 year old female whopresents with acute onset right facial numbness.  No associated weakness.  Onset 1015 hrs.  No associated changes in speech or swallowing.  She felt as if she may fall over or needed help with balance possibly versus near syncope.  No vertigo.  No hearing change.  Headache at onset that was central parietal.  No radiation.  No vision change.  She had no significant nausea or vomiting or light sensitivity.  No seizure activity.  She has had recent mild headaches on and off.  She has no focal weakness.  There is been no head injury.  He has no fever.  No palpitations or chest pain.  No history of CVA or underlying heart disease.  There were no palliative or provocative measures.  She continues to have mild to moderate numbness that appears to migrate across the right side of the face.  Past medical history is reviewed as below but no history of known heart disease cerebrovascular disease.  she has hyperlipidemia and hypothyroidism.    Allergies:  Allergies   Allergen Reactions     Sulfa Drugs Swelling     Facial swelling         Problem List:    Patient Active Problem List    Diagnosis Date Noted     Calculus of ureter 11/06/2018     Priority: Medium     Hypothyroidism 08/17/2009     Priority: Medium     Formatting of this note might be different from the original.  Epic       Allergic rhinitis 02/26/2007     Priority: Medium     Hyperlipidemia 02/26/2007     Priority: Medium     Formatting of this note might be different from the original.  High trigs from estrogen          Past Medical History:    Past Medical History:   Diagnosis Date     GERD (gastroesophageal reflux disease)      Kidney stone 10/2018     PONV (postoperative nausea and vomiting)        Past Surgical History:    Past Surgical History:   Procedure Laterality Date     APPENDECTOMY       ARTHROSCOPY SHOULDER ROTATOR CUFF  REPAIR Right 06/20/20118     CHOLECYSTECTOMY       HYSTERECTOMY       OOPHORECTOMY Bilateral      FL CYSTO/URETERO W/LITHOTRIPSY &INDWELL STENT INSRT Right 11/23/2018    Procedure: CYSTOSCOPY, RIGHT  URETEROSCOPY,  STENT INSERTION;  Surgeon: Siva Douglas MD;  Location: [facility];  Service: Urology     REPAIR BLADDER       TONSILLECTOMY       TUBAL LIGATION         Family History:    Family History   Problem Relation Age of Onset     Osteoporosis Mother      Cancer Father         pancreatic     Osteoporosis Sister      Urolithiasis Brother      Cancer Paternal Aunt         brain tumor     Osteoporosis Paternal Uncle      Cancer Maternal Grandmother         breast     Heart Disease Maternal Grandfather      Heart Disease Paternal Grandfather      Urolithiasis Niece      Urolithiasis Nephew      Cancer Paternal Aunt         breast       Social History:  Marital Status:   [2]  Social History     Tobacco Use     Smoking status: Never Smoker     Smokeless tobacco: Never Used   Substance Use Topics     Alcohol use: No     Drug use: No        Medications:    ESTROGENS CONJ SYNTHETIC A 0.45 MG OR TABS  METOCLOPRAMIDE HCL 10 MG OR TABS          Review of Systems   Constitutional: Negative for chills, diaphoresis and fever.   HENT: Negative for ear pain, sinus pressure and sore throat.    Eyes: Negative for visual disturbance.   Respiratory: Negative for cough, shortness of breath and wheezing.    Cardiovascular: Negative for chest pain and palpitations.   Gastrointestinal: Negative for abdominal pain, blood in stool, constipation, diarrhea, nausea and vomiting.   Genitourinary: Negative for dysuria, frequency and urgency.   Skin: Negative for rash.   Neurological: Positive for numbness and headaches.   All other systems reviewed and are negative.      Physical Exam   BP: 139/88  Pulse: 92  Temp: 96.9  F (36.1  C)  Resp: 16  Weight: 71.2 kg (157 lb)  SpO2: 95 %      Physical Exam  Constitutional:        General: She is in acute distress.   HENT:      Head: Atraumatic.   Eyes:      Conjunctiva/sclera: Conjunctivae normal.   Cardiovascular:      Rate and Rhythm: Normal rate and regular rhythm.      Heart sounds: No murmur heard.     Pulmonary:      Effort: No respiratory distress.      Breath sounds: No stridor. No wheezing or rhonchi.   Abdominal:      General: Abdomen is flat. There is no distension.      Palpations: Abdomen is soft. There is no mass.      Tenderness: There is no abdominal tenderness. There is no guarding.   Musculoskeletal:      Cervical back: Neck supple.      Right lower leg: No edema.      Left lower leg: No edema.   Skin:     Coloration: Skin is not pale.      Findings: No rash.   Neurological:      General: No focal deficit present.      Mental Status: She is alert.      Sensory: Sensory deficit (difficult to reproduce) present.      Motor: No weakness.         ED Course        Procedures                EKG Interpretation:      Interpreted by Dayron Raymundo MD  EKG done at 1157 hrs. demonstrates sinus rhythm 80 bpm normal axis.  There is no ST change.  No T wave changes other than T wave flattening lateral and inferior leads.  Normal R progression.  No Q waves.  Normal intervals.  Normal conduction.  No ectopy.  Impression sinus rhythm 80 bpm no acute changes.    Critical Care time:  none     The patient has stroke symptoms:         ED Stroke specific documentation           NIHSS PDF     Patient last known well time:  1015      National Institutes of Health Stroke Scale (Baseline)  Time Performed: 1200     Score    Level of consciousness: (0)   Alert, keenly responsive    LOC questions: (0)   Answers both questions correctly    LOC commands: (0)   Performs both tasks correctly    Best gaze: (0)   Normal    Visual: (0)   No visual loss    Facial palsy: (0)   Normal symmetrical movements    Motor arm (left): (0)   No drift    Motor arm (right): (0)   No drift    Motor leg (left): (0)   No  drift    Motor leg (right): (0)   No drift    Limb ataxia: (0)   Absent    Sensory: (1)   Mild to moderate sensory loss - difficult to reproduce    Best language: (0)   Normal- no aphasia    Dysarthria: (0)   Normal    Extinction and inattention: (0)   No abnormality        Total Score:  1        Stroke Mimics were considered (including migraine headache, seizure disorder, hypoglycemia (or hyperglycemia), head or spinal trauma, CNS infection, Toxin ingestion and shock state (e.g. sepsis) .                    Results for orders placed or performed during the hospital encounter of 10/24/21 (from the past 24 hour(s))   [place] Draw    Narrative    The following orders were created for panel order [place] Draw.  Procedure                               Abnormality         Status                     ---------                               -----------         ------                     Extra Blue Top Tube[[id number]]                              Final result               Extra Red Top Tube[[id number]]                               Final result               Extra Green Top (Lithium...[[id number]]                      Final result               Extra Green Top (Lithium...[[id number]]                      Final result               Extra Purple Top Tube[[id number]]                            Final result                 Please view results for these tests on the individual orders.   Extra Blue Top Tube   Result Value Ref Range    Hold Specimen JIC    Extra Red Top Tube   Result Value Ref Range    Hold Specimen JIC    Extra Green Top (Lithium Heparin) Tube   Result Value Ref Range    Hold Specimen JIC    Extra Green Top (Lithium Heparin) Tube   Result Value Ref Range    Hold Specimen JIC    Extra Purple Top Tube   Result Value Ref Range    Hold Specimen JIC    Basic metabolic panel   Result Value Ref Range    Sodium 141 133 - 144 mmol/L    Potassium 3.9 3.4 - 5.3 mmol/L    Chloride 110 (H) 94 - 109 mmol/L    Carbon Dioxide (CO2)  24 20 - 32 mmol/L    Anion Gap 7 3 - 14 mmol/L    Urea Nitrogen 12 7 - 30 mg/dL    Creatinine 0.70 0.52 - 1.04 mg/dL    Calcium 8.6 8.5 - 10.1 mg/dL    Glucose 124 (H) 70 - 99 mg/dL    GFR Estimate >90 >60 mL/min/1.73m2   CBC with platelets differential    Narrative    The following orders were created for panel order CBC with platelets differential.  Procedure                               Abnormality         Status                     ---------                               -----------         ------                     CBC with platelets and d...[[id number]]                      Final result                 Please view results for these tests on the individual orders.   Troponin I   Result Value Ref Range    Troponin I <0.015 0.000 - 0.045 ug/L   CBC with platelets and differential   Result Value Ref Range    WBC Count 6.3 4.0 - 11.0 10e3/uL    RBC Count 4.88 3.80 - 5.20 10e6/uL    Hemoglobin 14.4 11.7 - 15.7 g/dL    Hematocrit 43.6 35.0 - 47.0 %    MCV 89 78 - 100 fL    MCH 29.5 26.5 - 33.0 pg    MCHC 33.0 31.5 - 36.5 g/dL    RDW 13.3 10.0 - 15.0 %    Platelet Count 262 150 - 450 10e3/uL    % Neutrophils 45 %    % Lymphocytes 41 %    % Monocytes 8 %    % Eosinophils 5 %    % Basophils 1 %    % Immature Granulocytes 0 %    NRBCs per 100 WBC 0 <1 /100    Absolute Neutrophils 2.9 1.6 - 8.3 10e3/uL    Absolute Lymphocytes 2.6 0.8 - 5.3 10e3/uL    Absolute Monocytes 0.5 0.0 - 1.3 10e3/uL    Absolute Eosinophils 0.3 0.0 - 0.7 10e3/uL    Absolute Basophils 0.1 0.0 - 0.2 10e3/uL    Absolute Immature Granulocytes 0.0 <=0.0 10e3/uL    Absolute NRBCs 0.0 10e3/uL   Glucose by meter   Result Value Ref Range    GLUCOSE BY METER POCT 128 (H) 70 - 99 mg/dL   Head CT w/o contrast    Narrative    EXAM: CT HEAD W/O CONTRAST, CTA  HEAD NECK WITH CONTRAST  LOCATION: [facility]  DATE/TIME: 10/24/2021 11:43 AM    INDICATION: Neuro deficit, acute, stroke suspected, headache, right-sided facial  numbness.  COMPARISON: None.  CONTRAST: 70 mL Isovue-370  TECHNIQUE: Head and neck CT angiogram with IV contrast. Noncontrast head CT followed by axial helical CT images of the head and neck vessels obtained during the arterial phase of intravenous contrast administration. Axial 2D reconstructed images and   multiplanar 3D MIP reconstructed images of the head and neck vessels were performed by the technologist. Dose reduction techniques were used. All stenosis measurements made according to NASCET criteria unless otherwise specified.    FINDINGS:   NONCONTRAST HEAD CT:   INTRACRANIAL CONTENTS: No definitive acute intracranial hemorrhage, extraaxial collection, or mass effect. There is a punctate tiny focus of increased density in the left basal ganglia seen on series 3 image 14 and also on sagittal reformatted image 22.   It is somewhat vague on the axial images. Overall I suspect this is a tiny basal ganglia calcification. A small hemorrhage is felt to be less likely. Additionally there is a similar punctate zone of increased density in the right basal ganglia seen on   series 3 image 13 and sagittal reformatted image 16 also likely indicating a small calcification. No CT evidence of acute infarct. Normal parenchymal attenuation. Normal ventricles and sulci.     VISUALIZED ORBITS/SINUSES/MASTOIDS: No intraorbital abnormality. No paranasal sinus mucosal disease. No middle ear or mastoid effusion.    BONES/SOFT TISSUES: No acute abnormality.    HEAD CTA:  ANTERIOR CIRCULATION: No stenosis/occlusion, aneurysm, or high flow vascular malformation. Standard [place] anatomy.    POSTERIOR CIRCULATION: No stenosis/occlusion, aneurysm, or high flow vascular malformation. Balanced vertebral arteries supply a normal basilar artery.     DURAL VENOUS SINUSES: Expected enhancement of the major dural venous sinuses.    NECK CTA:  RIGHT CAROTID: No measurable stenosis or dissection.    LEFT CAROTID: No measurable stenosis  or dissection.    VERTEBRAL ARTERIES: No focal stenosis or dissection. Balanced vertebral arteries.    AORTIC ARCH: Classic aortic arch anatomy with no significant stenosis at the origin of the great vessels.    NONVASCULAR STRUCTURES: Unremarkable.      Impression    IMPRESSION:   HEAD CT:  1.  No definite acute intracranial abnormality.  2.  As discussed above there are punctate densities in the left and right basal ganglia which on the sagittal reformatted images appear to represent tiny calcifications or less likely small areas of hemorrhage. Patient will be going to MRI.    HEAD CTA:   1.  Normal CTA [place].    NECK CTA:  1.  Normal neck CTA.  2.  Findings called to Dr. Raymundo in the [facility] emergency room at 12:30 PM on 10/24/2021.   CTA Head Neck with Contrast    Narrative    EXAM: CT HEAD W/O CONTRAST, CTA  HEAD NECK WITH CONTRAST  LOCATION: [facility]  DATE/TIME: 10/24/2021 11:43 AM    INDICATION: Neuro deficit, acute, stroke suspected, headache, right-sided facial numbness.  COMPARISON: None.  CONTRAST: 70 mL Isovue-370  TECHNIQUE: Head and neck CT angiogram with IV contrast. Noncontrast head CT followed by axial helical CT images of the head and neck vessels obtained during the arterial phase of intravenous contrast administration. Axial 2D reconstructed images and   multiplanar 3D MIP reconstructed images of the head and neck vessels were performed by the technologist. Dose reduction techniques were used. All stenosis measurements made according to NASCET criteria unless otherwise specified.    FINDINGS:   NONCONTRAST HEAD CT:   INTRACRANIAL CONTENTS: No definitive acute intracranial hemorrhage, extraaxial collection, or mass effect. There is a punctate tiny focus of increased density in the left basal ganglia seen on series 3 image 14 and also on sagittal reformatted image 22.   It is somewhat vague on the axial images. Overall I suspect this is a tiny basal  ganglia calcification. A small hemorrhage is felt to be less likely. Additionally there is a similar punctate zone of increased density in the right basal ganglia seen on   series 3 image 13 and sagittal reformatted image 16 also likely indicating a small calcification. No CT evidence of acute infarct. Normal parenchymal attenuation. Normal ventricles and sulci.     VISUALIZED ORBITS/SINUSES/MASTOIDS: No intraorbital abnormality. No paranasal sinus mucosal disease. No middle ear or mastoid effusion.    BONES/SOFT TISSUES: No acute abnormality.    HEAD CTA:  ANTERIOR CIRCULATION: No stenosis/occlusion, aneurysm, or high flow vascular malformation. Standard [place] anatomy.    POSTERIOR CIRCULATION: No stenosis/occlusion, aneurysm, or high flow vascular malformation. Balanced vertebral arteries supply a normal basilar artery.     DURAL VENOUS SINUSES: Expected enhancement of the major dural venous sinuses.    NECK CTA:  RIGHT CAROTID: No measurable stenosis or dissection.    LEFT CAROTID: No measurable stenosis or dissection.    VERTEBRAL ARTERIES: No focal stenosis or dissection. Balanced vertebral arteries.    AORTIC ARCH: Classic aortic arch anatomy with no significant stenosis at the origin of the great vessels.    NONVASCULAR STRUCTURES: Unremarkable.      Impression    IMPRESSION:   HEAD CT:  1.  No definite acute intracranial abnormality.  2.  As discussed above there are punctate densities in the left and right basal ganglia which on the sagittal reformatted images appear to represent tiny calcifications or less likely small areas of hemorrhage. Patient will be going to MRI.    HEAD CTA:   1.  Normal CTA [place].    NECK CTA:  1.  Normal neck CTA.  2.  Findings called to Dr. Raymundo in the [facility] emergency room at 12:30 PM on 10/24/2021.   MR Brain w/o & w Contrast    Narrative    EXAM: MR BRAIN W/O and W CONTRAST  LOCATION: [facility]  DATE/TIME: 10/24/2021  12:29 PM    INDICATION: Neuro deficit, acute, stroke suspected. Headache and right-sided facial numbness.  COMPARISON: 10/24/2021 head CT/head and neck CTA.  CONTRAST: 7 mL Gadavist  TECHNIQUE: Routine multiplanar multisequence head MRI without and with intravenous contrast.    FINDINGS:  INTRACRANIAL CONTENTS: No finding for acute infarct, intracranial hemorrhage, or extra-axial collection. Developmental venous anomaly in the right cerebellar hemisphere is incidental. No suspicious or concerning foci of abnormal enhancement. Small foci   of presumed physiologic mineralization in the bilateral basal ganglia. No additional parenchymal signal abnormality. Normal ventricular size and configuration. Major intracranial vascular flow voids are preserved. Brainstem and cerebellum are   unremarkable. Cerebellar tonsils are normally positioned.    SELLA: No abnormality accounting for technique.    OSSEOUS STRUCTURES/SOFT TISSUES: Normal marrow signal.    ORBITS: No abnormality accounting for technique.     SINUSES/MASTOIDS: No paranasal sinus mucosal disease. No middle ear or mastoid effusion.       Impression    IMPRESSION:  1.  No finding for acute infarct, intracranial hemorrhage, extra-axial collection, or suspicious focus of abnormal enhancement.    2.  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           Medications   iopamidol (ISOVUE-370) solution 70 mL (70 mLs Intravenous Given 10/24/21 1144)   sodium chloride 0.9 % bag 500mL for CT scan flush use (100 mLs Intravenous Given 10/24/21 1144)   gadobutrol (GADAVIST) injection 7 mL (7 mLs Intravenous Given 10/24/21 1230)       Assessments & Plan (with Medical Decision Making)     MDM: Fidelina Ferris is a 65 year old female presenting with history of hyperlipidemia and hypothyroidism who has to plus hours of numbness right face variable locations across the right face without other changes other than headache mid parietal  and no changes in speech or swallowing.  No focal weakness.  No head injury.  She has also syncope sensation.  NIH is 1 for sensory loss is difficult to reproduce on exam.  Plan for code stroke evaluation for numbness.  My suspicion for alternative causes also high however.  She has a headache.  No history of migraines.  No vision change or nausea vomiting.      Her imaging demonstrated an incidental finding in the thalamus on the left side but in discussion with neuroradiology this was seen in other views and appears to be a calcification.  The MRI also demonstrates no lesion.  No signs of bleeding or CVA.  I discussed with the patient and her  differential diagnosis including trigeminal nerve related conditions such as hemicrania as she did have a headache at the time.  Also discussed migraine headache although she had no light sensitivity nausea vomiting but she will know what to look for in terms of symptoms.  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  Precautions given for return.  Recommend follow-up with  primary care.      I have reviewed the nursing notes.    I have reviewed the findings, diagnosis, plan and need for follow up with the patient.       Discharge Medication List as of 10/24/2021  1:52 PM          Final diagnoses:   Right facial numbness - no serious injury - this could be trigeminal nerve syndrome liliana with headache, such as hemicrania, trigeminal neuralgia, or also atypical migraine could cause this as well.   Observe for rash.  at times shingles can do this. herpes simplex virus can also do this. observe for cold sores.   Episodic tension-type headache, not intractable       10/24/2021   [facility] EMERGENCY DEPT     Dayron Raymundo MD  10/24/21 1167

## 2021-10-24 NOTE — CONSULTS
"[facility]    Stroke Telephone Note    I was called by  on 10/24/21 regarding patient Fidelina Ferris. The patient is a 65 year old female who presents to the ED with numbness of the R face that began today at 10:15 am. She has a past medical history of hyperlipidemia and hypothyroidism. She has a moderate headache. No vision changes and no focal deficits on exam.    Stroke Code Data (for stroke code without tele)  Stroke code activated 10/24/21   1141   Stroke provider first response  10/24/21   1145            Last known normal 10/24/21   1014        Time of discovery   (or onset of symptoms) 10/24/21   1015   Head CT read by Stroke Neuro Dr/Provider 10/24/21   1155   Was stroke code de-escalated?   10/24/21 1205          Imaging Findings   CT/CTA normal except for a very tiny area of calcification vs less likely acute hemorrhage in the L basal ganglia. Brain MRI normal and favoring calcification in this area.    Thrombolytic Treatment   Not given due to minor/isolated/quickly resolving symptoms.    Endovascular Treatment  Not initiated due to absence of proximal vessel occlusion    Impression  Acute headache with facial numbness of unclear etiology, ?Migraine    Recommendations   - No further stroke workup. Pt can follow up with PCP    Discussed with my attending Dr Abraham    My recommendations are based on the information provided over the phone by Fidelina Ferris's in-person providers. They are not intended to replace the clinical judgment of her in-person providers. I was not requested to personally see or examine the patient at this time.    Kath Sanford PA-C  Neurology  10/24/2021 2:05 PM  To page me or covering stroke neurology team member, click here: [facility]COM   Choose \"On Call\" tab at top, then search dropdown box for \"Neurology Adult\", select location, press Enter, then look for stroke/neuro ICU/telestroke.      For billing reasons spent 35 minutes today discussing the " pt's case and personally reviewing imaging in real time and documenting.

## 2021-10-24 NOTE — ED NOTES
Pt arrives after episode of sudden pain at the top of her head x2, suddenly and quick. That lead to right sided facial numbness. No hx of stroke, denies blood thinner use. No change in vision. No nausea, vomiting or diarrhea. Denies recent illness. Speaking in full sentances, airway intact. NAD. MD Raymundo at bedside. Denies headache at present.

## 2021-10-24 NOTE — DISCHARGE INSTRUCTIONS
ICD-10-CM    1. Right facial numbness  R20.0     no serious injury - this could be trigeminal nerve syndrome liliana with headache, such as hemicrania, trigeminal neuralgia, or also atypical migraine could cause this as well.   Observe for rash.  at times shingles can do this. herpes simplex virus can also do this. observe for cold sores.   2. Episodic tension-type headache, not intractable  G44.219

## 2021-10-24 NOTE — ED TRIAGE NOTES
Right-sided facial numbness since approximately 1015 after having a couple of sharp pains in head.  Patient had some tingling in right hand.  Patient denies weakness on one side, difficulty with speech.  Patient denies nausea/vomiting.

## 2022-06-19 ENCOUNTER — HEALTH MAINTENANCE LETTER (OUTPATIENT)
Age: 67
End: 2022-06-19

## 2022-11-19 ENCOUNTER — HEALTH MAINTENANCE LETTER (OUTPATIENT)
Age: 67
End: 2022-11-19

## 2023-04-09 ENCOUNTER — HEALTH MAINTENANCE LETTER (OUTPATIENT)
Age: 68
End: 2023-04-09

## 2024-01-28 ENCOUNTER — HEALTH MAINTENANCE LETTER (OUTPATIENT)
Age: 69
End: 2024-01-28

## 2024-06-16 ENCOUNTER — HEALTH MAINTENANCE LETTER (OUTPATIENT)
Age: 69
End: 2024-06-16